# Patient Record
Sex: FEMALE | Race: WHITE | NOT HISPANIC OR LATINO | Employment: STUDENT | URBAN - METROPOLITAN AREA
[De-identification: names, ages, dates, MRNs, and addresses within clinical notes are randomized per-mention and may not be internally consistent; named-entity substitution may affect disease eponyms.]

---
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
## 2023-05-04 ENCOUNTER — HOSPITAL ENCOUNTER (EMERGENCY)
Facility: HOSPITAL | Age: 18
Discharge: LEFT BEFORE DISCHARGE | End: 2023-05-05
Attending: EMERGENCY MEDICINE
Payer: COMMERCIAL

## 2023-05-04 ENCOUNTER — APPOINTMENT (OUTPATIENT)
Dept: CT IMAGING | Facility: HOSPITAL | Age: 18
End: 2023-05-04
Payer: COMMERCIAL

## 2023-05-04 VITALS
RESPIRATION RATE: 16 BRPM | TEMPERATURE: 99.3 F | SYSTOLIC BLOOD PRESSURE: 166 MMHG | DIASTOLIC BLOOD PRESSURE: 98 MMHG | WEIGHT: 160 LBS | HEART RATE: 107 BPM | HEIGHT: 70 IN | BODY MASS INDEX: 22.9 KG/M2 | OXYGEN SATURATION: 96 %

## 2023-05-04 DIAGNOSIS — M54.2 NECK PAIN: ICD-10-CM

## 2023-05-04 DIAGNOSIS — S29.012A STRAIN OF MID-BACK, INITIAL ENCOUNTER: ICD-10-CM

## 2023-05-04 DIAGNOSIS — S32.010A COMPRESSION FRACTURE OF L1 VERTEBRA, INITIAL ENCOUNTER: Primary | ICD-10-CM

## 2023-05-04 PROCEDURE — 72131 CT LUMBAR SPINE W/O DYE: CPT

## 2023-05-04 PROCEDURE — 99283 EMERGENCY DEPT VISIT LOW MDM: CPT

## 2023-05-04 PROCEDURE — 72125 CT NECK SPINE W/O DYE: CPT

## 2023-05-04 PROCEDURE — 72128 CT CHEST SPINE W/O DYE: CPT

## 2023-05-04 RX ORDER — CYCLOBENZAPRINE HCL 10 MG
10 TABLET ORAL 3 TIMES DAILY PRN
Qty: 21 TABLET | Refills: 0 | Status: SHIPPED | OUTPATIENT
Start: 2023-05-04 | End: 2023-05-11

## 2023-05-04 RX ORDER — BACITRACIN ZINC 500 [USP'U]/G
1 OINTMENT TOPICAL ONCE
Status: DISCONTINUED | OUTPATIENT
Start: 2023-05-04 | End: 2023-05-05 | Stop reason: HOSPADM

## 2023-05-04 RX ORDER — HYDROCODONE BITARTRATE AND ACETAMINOPHEN 5; 325 MG/1; MG/1
1 TABLET ORAL EVERY 6 HOURS PRN
Qty: 20 TABLET | Refills: 0 | Status: SHIPPED | OUTPATIENT
Start: 2023-05-04

## 2023-05-05 NOTE — ED NOTES
"Pt is refusing to remain in the bed. Pt has been warned multiple times of risks d/t her her RADHA but she insists that she wants to get up. States \"I don't care. I want to get up. I will sign a paper saying I wont roxann.\"   "

## 2023-05-05 NOTE — DISCHARGE INSTRUCTIONS
Medication as directed.  Wear TLSO brace except for bathing.  Recommend wearing during sleep if you have pain or move about much during sleep.  Follow-up with neurosurgery as above.  Apply heat to sore stiff muscles.  Apply ice for pain.  Follow-up with neurosurgery as above.  Return to ED for medical emergencies

## 2023-05-05 NOTE — ED NOTES
Patient would not wait for [facility] representative to place back brace on Pt. Pt was educated by previous nurse and this nurse of the importance of staying to have back brace put on by Jaqueline. Pt verbalized understanding and still insisted on leaving. This nurse provided [facility] number to patient.
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
## 2024-08-15 ENCOUNTER — PATIENT ROUNDING (BHMG ONLY) (OUTPATIENT)
Dept: OBSTETRICS AND GYNECOLOGY | Age: 19
End: 2024-08-15
Payer: COMMERCIAL

## 2024-08-15 ENCOUNTER — OFFICE VISIT (OUTPATIENT)
Dept: OBSTETRICS AND GYNECOLOGY | Age: 19
End: 2024-08-15
Payer: COMMERCIAL

## 2024-08-15 VITALS
SYSTOLIC BLOOD PRESSURE: 112 MMHG | HEIGHT: 69 IN | BODY MASS INDEX: 20.91 KG/M2 | DIASTOLIC BLOOD PRESSURE: 72 MMHG | WEIGHT: 141.2 LBS

## 2024-08-15 DIAGNOSIS — Z12.4 SCREENING FOR MALIGNANT NEOPLASM OF CERVIX: ICD-10-CM

## 2024-08-15 DIAGNOSIS — Z11.3 SCREENING EXAMINATION FOR STI: ICD-10-CM

## 2024-08-15 DIAGNOSIS — R10.2 PELVIC PAIN: ICD-10-CM

## 2024-08-15 DIAGNOSIS — Z01.419 ENCOUNTER FOR WELL WOMAN EXAM WITH ROUTINE GYNECOLOGICAL EXAM: Primary | ICD-10-CM

## 2024-08-15 DIAGNOSIS — N93.9 VAGINAL BLEEDING, ABNORMAL: ICD-10-CM

## 2024-08-15 DIAGNOSIS — N94.10 FEMALE DYSPAREUNIA: ICD-10-CM

## 2024-08-15 NOTE — ASSESSMENT & PLAN NOTE
Discussed likely etiology of 3 month bleeding is episode is atrophic state of endometrium due to prolonged depo use. As this has currently improved, agreeable to monitoring symptoms but could trial addition of OCP for short course vs alternative contraceptive option.

## 2024-08-15 NOTE — PROGRESS NOTES
[facility]  Obstetrics and Gynecology   Routine Annual Visit    8/15/2024    Patient: Milli Jones          MR#:[id number]    History of Present Illness    Chief Complaint   Patient presents with    Gynecologic Exam     New Gyn, last annual 12/15/22, Annual today, Pt c/o pain with sexual intercourse,       19 y.o. female  who presents for well woman exam.     Patient's primary concern is pelvic pain and dyspareunia. This has been present for several years. The pelvic pain not associated with sex she has always associated with ovarian cysts, though she has not had imaging to confirm presence of any cysts. She will sometimes feel this multiple times a month which concerns her. The dyspareunia is present deep in her pelvis no matter the position and will persist following intercourse for 2 hours. Described as tearing. It will sometimes cause her to vomit. Denies post coital bleeding or vaginal dryness.    She has been on depo provera for almost 3 years with last dose 7/3. With the dose given before that, she had nearly daily heavy bleeding and cramping. This has improved with her most recent dose.     She is unsure what her typical menses is like as she has been on OCPs or depo for a long time. Completed gardasil series in 2019.    Obstetric History:  OB History          1    Para   1    Term           1    AB        Living             SAB        IAB        Ectopic        Molar        Multiple        Live Births                   Menstrual History:     Patient's last menstrual period was 2024 (approximate).       Sexual History:    In monogamous relationship with boyfriend.  ________________________________________  Patient Active Problem List   Diagnosis    Miscarriage    Fatigue    Smoking    Benign essential tremor     Past Medical History:   Diagnosis Date    Anemia     Anxiety     Depression      Past Surgical History:   Procedure Laterality Date    CYST REMOVAL       "WISDOM TOOTH EXTRACTION       Social History     Tobacco Use   Smoking Status Every Day    Current packs/day: 1.00    Average packs/day: 1 pack/day for 13.6 years (13.6 ttl pk-yrs)    Types: Cigarettes    Start date: 1/1/2011   Smokeless Tobacco Never     Patient previously in foster care. Presented with her \"mother in law\" who is very supportive.    Family History   Problem Relation Age of Onset    No Known Problems Mother     No Known Problems Father      Prior to Admission medications    Medication Sig Start Date End Date Taking? Authorizing Provider   medroxyPROGESTERone (DEPO-PROVERA) 150 MG/ML injection Inject 1 mL into the appropriate muscle as directed by prescriber Every 3 (Three) Months.   Yes Provider, Armando, MD     ________________________________________    Review of Systems   Gastrointestinal:  Negative for abdominal pain.   Genitourinary:  Positive for dyspareunia, pelvic pain and vaginal bleeding. Negative for dysuria, hematuria, urgency, vaginal discharge and vaginal pain.          Objective     /72   Ht 175.3 cm (69.02\")   Wt 64 kg (141 lb 3.2 oz)   LMP 07/25/2024 (Approximate)   BMI 20.84 kg/m²    BP Readings from Last 3 Encounters:   08/15/24 112/72   05/14/24 97/63   05/04/23 166/98      Wt Readings from Last 3 Encounters:   08/15/24 64 kg (141 lb 3.2 oz)   05/14/24 64.5 kg (142 lb 4.8 oz)   05/04/23 72.6 kg (160 lb)        BMI: Estimated body mass index is 20.84 kg/m² as calculated from the following:    Height as of this encounter: 175.3 cm (69.02\").    Weight as of this encounter: 64 kg (141 lb 3.2 oz).    Physical Exam  Vitals reviewed.   Constitutional:       Appearance: Normal appearance. She is normal weight.   HENT:      Head: Normocephalic and atraumatic.   Pulmonary:      Effort: Pulmonary effort is normal.   Abdominal:      Palpations: Abdomen is soft.      Tenderness: There is no abdominal tenderness.   Genitourinary:     Comments: Normal external female genitalia, " "normal appearing labia majora and minora, no labial adhesions, normal vagina, normal appearing cervix, normal urethra. Internal exam without palpable cervical, uterine, or adnexal masses, minimal bilateral adnexal tenderness. Uterus mobile and approximately 6 size. No evidence of prolapse    Neurological:      General: No focal deficit present.      Mental Status: She is alert.       Assessment:  Milli Jones is a 19 y.o.  presenting for well woman exam.    Diagnoses and all orders for this visit:    1. Encounter for well woman exam with routine gynecological exam (Primary)  -     IGP,CtNgTv,Apt HPV  -     Chlamydia trachomatis, Neisseria gonorrhoeae, Trichomonas vaginalis, PCR - Swab, Vagina  -     IGP, Apt HPV,rfx 16 / 18,45; Future  -     IGP, Apt HPV,rfx 16 / 18,45    2. Screening examination for STI    3. Screening for malignant neoplasm of cervix    4. Pelvic pain  Assessment & Plan:  Patient with intermittent pelvic pain in addition to consistent deep dyspareunia. Reviewed possible etiologies of this including ovarian cyst rupture, endometriosis, structural causes, or other non-GYN etiologies. Will evaluate for possible structural causes with US prior to next appointment. Discussed with patient endometriosis could only be definitively diagnosed with surgery but can trial alternative treatments prior to moving to that option. Was told by prior provider her pain could be due to \"scar tissue\" from her second trimester loss - we discussed this is extremely unlikely.      5. Vaginal bleeding, abnormal  Assessment & Plan:  Discussed likely etiology of 3 month bleeding is episode is atrophic state of endometrium due to prolonged depo use. As this has currently improved, agreeable to monitoring symptoms but could trial addition of OCP for short course vs alternative contraceptive option.      6. Female dyspareunia  Assessment & Plan:  See pelvic pain        Plan:  Return for Recheck pelvic pain. In 2-3 weeks " with US prior to appointment.      Ashly Gutierres MD  8/15/2024 13:43 EDT

## 2024-08-15 NOTE — ASSESSMENT & PLAN NOTE
"Patient with intermittent pelvic pain in addition to consistent deep dyspareunia. Reviewed possible etiologies of this including ovarian cyst rupture, endometriosis, structural causes, or other non-GYN etiologies. Will evaluate for possible structural causes with US prior to next appointment. Discussed with patient endometriosis could only be definitively diagnosed with surgery but can trial alternative treatments prior to moving to that option. Was told by prior provider her pain could be due to \"scar tissue\" from her second trimester loss - we discussed this is extremely unlikely.  "

## 2024-08-16 LAB
C TRACH RRNA SPEC QL NAA+PROBE: NEGATIVE
N GONORRHOEA RRNA SPEC QL NAA+PROBE: NEGATIVE
T VAGINALIS RRNA SPEC QL NAA+PROBE: NEGATIVE

## 2024-08-20 LAB
CYTOLOGIST CVX/VAG CYTO: NORMAL
CYTOLOGY CVX/VAG DOC CYTO: NORMAL
CYTOLOGY CVX/VAG DOC THIN PREP: NORMAL
DX ICD CODE: NORMAL
HPV I/H RISK 4 DNA CVX QL PROBE+SIG AMP: NEGATIVE
Lab: NORMAL
OTHER STN SPEC: NORMAL
STAT OF ADQ CVX/VAG CYTO-IMP: NORMAL

## 2024-09-05 ENCOUNTER — OFFICE VISIT (OUTPATIENT)
Dept: OBSTETRICS AND GYNECOLOGY | Age: 19
End: 2024-09-05
Payer: COMMERCIAL

## 2024-09-05 VITALS
WEIGHT: 141.2 LBS | BODY MASS INDEX: 20.91 KG/M2 | SYSTOLIC BLOOD PRESSURE: 118 MMHG | HEIGHT: 69 IN | DIASTOLIC BLOOD PRESSURE: 70 MMHG

## 2024-09-05 DIAGNOSIS — R10.2 PELVIC PAIN: Primary | ICD-10-CM

## 2024-09-05 DIAGNOSIS — N94.10 FEMALE DYSPAREUNIA: ICD-10-CM

## 2024-09-05 PROCEDURE — 99213 OFFICE O/P EST LOW 20 MIN: CPT | Performed by: STUDENT IN AN ORGANIZED HEALTH CARE EDUCATION/TRAINING PROGRAM

## 2024-09-05 RX ORDER — NORETHINDRONE ACETATE AND ETHINYL ESTRADIOL 1MG-20(21)
1 KIT ORAL DAILY
Qty: 84 TABLET | Refills: 3 | Status: SHIPPED | OUTPATIENT
Start: 2024-09-05

## 2024-09-05 NOTE — ASSESSMENT & PLAN NOTE
Patient with pelvic pain unchanged since last visit.  Has also started to have more irregular spotting since her Depo injection at the beginning of July 2024.  Ultrasound performed today was normal without possible structural etiology to explain patient's pelvic pain and dyspareunia.  At this point we discussed that our differential includes possible ovarian cysts, which patient believes she has even though none were visualized today, or endometriosis.  I also discussed that I feel her irregular spotting is due to her long-term progesterone use, as she has been on Depo-Provera for 3 years.  At this point, I feel patient would benefit from cessation of Depo-Provera, and initiation of combined oral contraceptives with placebo pills every months.  We discussed that this will allow her endometrium to receive some estrogen and resume monthly shedding to normalize her bleeding.  We also discussed that if her pain is due to ovarian cysts, this should prevent their recurrence.  We also discussed that OCPs would treat endometriosis.  At this time we will trial monthly menses, to get an idea if heavy menstrual bleeding or dysmenorrhea is an issue for her despite the use of oral contraceptives.  Patient has been on various birth controls essentially since the onset of menses, and she has no idea what her normal cycle is like.  Extensive counseling performed regarding daily contraceptive use at same time, and documentation of any symptoms including irregular bleeding, pain, or anything that feels significant to her so that we have an accurate journal of her experience.  All questions and concerns addressed.  Patient in agreement with plan.

## 2024-09-05 NOTE — PROGRESS NOTES
[facility]   Obstetrics and Gynecology   Return Gynecology Visit    2024    Patient: Milli Jones          MR#:[id number]    History of Present Illness    Chief Complaint   Patient presents with    Gynecologic Exam     Pt c/o pain with intercourse. Going on for 2 years. So bad she vomits       19 y.o. female  who presents for follow-up of pelvic pain and dyspareunia.    Patient reports her symptoms are essentially unchanged.  She continues to have bilateral discomfort in the areas of her ovaries intermittently.  Also continues to have deep dyspareunia.  She notes that she has started to have more irregular spotting since her last Depo-Provera dose in July.  We reviewed her contraceptive use history and clarified that she was on OCPs from age 7 to her mid teen years.  She then had her second trimester loss, and immediately had Nexplanon in place for 2 years.  She has then used Depo for the past 3.    Obstetric History:  OB History          1    Para   1    Term           1    AB        Living             SAB        IAB        Ectopic        Molar        Multiple        Live Births                   Menstrual History:     Patient's last menstrual period was 2024 (approximate).     ________________________________________  Patient Active Problem List   Diagnosis    Miscarriage    Fatigue    Smoking    Benign essential tremor    Vaginal bleeding, abnormal    Pelvic pain    Female dyspareunia     Past Medical History:   Diagnosis Date    Anemia     Anxiety     Depression      Past Surgical History:   Procedure Laterality Date    CYST REMOVAL      right hand    TYMPANOSTOMY      WISDOM TOOTH EXTRACTION       Social History     Tobacco Use   Smoking Status Former    Current packs/day: 0.00    Average packs/day: 1 pack/day for 13.5 years (13.5 ttl pk-yrs)    Types: Cigarettes    Start date: 2011    Quit date: 2024    Years since quittin.1   Smokeless Tobacco  "Never   Tobacco Comments    Vapes with nicotine     Family History   Problem Relation Age of Onset    No Known Problems Father     No Known Problems Mother     Endometriosis Maternal Grandmother      Prior to Admission medications    Medication Sig Start Date End Date Taking? Authorizing Provider   medroxyPROGESTERone (DEPO-PROVERA) 150 MG/ML injection Inject 1 mL into the appropriate muscle as directed by prescriber Every 3 (Three) Months.   Yes Provider, MD Armando     ________________________________________    Review of Systems   Constitutional:  Negative for chills and fever.   Gastrointestinal:  Negative for abdominal pain, constipation, diarrhea, nausea and vomiting.   Genitourinary:  Positive for dyspareunia and pelvic pain. Negative for dysuria, frequency and urgency.            Objective     /70   Ht 175.3 cm (69.02\")   Wt 64 kg (141 lb 3.2 oz)   LMP 07/25/2024 (Approximate)   BMI 20.84 kg/m²    BP Readings from Last 3 Encounters:   09/05/24 118/70   08/15/24 112/72   05/14/24 97/63      Wt Readings from Last 3 Encounters:   09/05/24 64 kg (141 lb 3.2 oz)   08/15/24 64 kg (141 lb 3.2 oz)   05/14/24 64.5 kg (142 lb 4.8 oz)        BMI: Estimated body mass index is 20.84 kg/m² as calculated from the following:    Height as of this encounter: 175.3 cm (69.02\").    Weight as of this encounter: 64 kg (141 lb 3.2 oz).    Physical Exam  Vitals and nursing note reviewed.   Constitutional:       General: She is not in acute distress.     Appearance: Normal appearance.   HENT:      Head: Normocephalic and atraumatic.   Eyes:      Extraocular Movements: Extraocular movements intact.   Cardiovascular:      Rate and Rhythm: Normal rate and regular rhythm.   Pulmonary:      Effort: Pulmonary effort is normal. No respiratory distress.   Abdominal:      General: There is no distension.      Palpations: Abdomen is soft. There is no mass.      Tenderness: There is no abdominal tenderness.   Musculoskeletal:    "      General: No swelling. Normal range of motion.      Cervical back: Normal range of motion.   Skin:     General: Skin is warm and dry.   Neurological:      General: No focal deficit present.      Mental Status: She is alert and oriented to person, place, and time.   Psychiatric:         Mood and Affect: Mood normal.         Behavior: Behavior normal.         TVUS 2024   Impression:    1.  Uterus: Normal size, with uterine dimensions 7.4 x 5.1 x 3.6 cm, and Anteverted    2.  Endometrium: Normal non-menopausal thickness    3.  Myometrium: Normal homogenous texture     4.  Ovaries   Left: Normal/unremarkable    Right: Normal/unremarkable     Assessment:  Milli Jones is a 19 y.o.  presenting for follow-up of pelvic pain and dyspareunia.    Diagnoses and all orders for this visit:    1. Pelvic pain (Primary)  Assessment & Plan:  Patient with pelvic pain unchanged since last visit.  Has also started to have more irregular spotting since her Depo injection at the beginning of 2024.  Ultrasound performed today was normal without possible structural etiology to explain patient's pelvic pain and dyspareunia.  At this point we discussed that our differential includes possible ovarian cysts, which patient believes she has even though none were visualized today, or endometriosis.  I also discussed that I feel her irregular spotting is due to her long-term progesterone use, as she has been on Depo-Provera for 3 years.  At this point, I feel patient would benefit from cessation of Depo-Provera, and initiation of combined oral contraceptives with placebo pills every months.  We discussed that this will allow her endometrium to receive some estrogen and resume monthly shedding to normalize her bleeding.  We also discussed that if her pain is due to ovarian cysts, this should prevent their recurrence.  We also discussed that OCPs would treat endometriosis.  At this time we will trial monthly menses, to get an  idea if heavy menstrual bleeding or dysmenorrhea is an issue for her despite the use of oral contraceptives.  Patient has been on various birth controls essentially since the onset of menses, and she has no idea what her normal cycle is like.  Extensive counseling performed regarding daily contraceptive use at same time, and documentation of any symptoms including irregular bleeding, pain, or anything that feels significant to her so that we have an accurate journal of her experience.  All questions and concerns addressed.  Patient in agreement with plan.    Orders:  -     norethindrone-ethinyl estradiol FE (JUNEL FE 1/20) 1-20 MG-MCG per tablet; Take 1 tablet by mouth Daily. For new start begin the Sunday after the onset of your next menstrual cycle  Dispense: 84 tablet; Refill: 3    2. Female dyspareunia      Plan:  Return in about 8 weeks (around 10/31/2024) for f/u pelvic pain, OCPs.      Ashly Gutierres MD  9/5/2024 13:50 EDT

## 2024-10-24 ENCOUNTER — OFFICE VISIT (OUTPATIENT)
Dept: OBSTETRICS AND GYNECOLOGY | Age: 19
End: 2024-10-24
Payer: COMMERCIAL

## 2024-10-24 VITALS
HEIGHT: 69 IN | SYSTOLIC BLOOD PRESSURE: 104 MMHG | DIASTOLIC BLOOD PRESSURE: 76 MMHG | WEIGHT: 140.4 LBS | BODY MASS INDEX: 20.79 KG/M2

## 2024-10-24 DIAGNOSIS — N93.9 VAGINAL BLEEDING, ABNORMAL: ICD-10-CM

## 2024-10-24 DIAGNOSIS — N94.10 FEMALE DYSPAREUNIA: Primary | ICD-10-CM

## 2024-10-24 DIAGNOSIS — R10.2 PELVIC PAIN: ICD-10-CM

## 2024-10-24 RX ORDER — PROMETHAZINE HYDROCHLORIDE 25 MG/1
25 TABLET ORAL EVERY 6 HOURS PRN
Qty: 60 TABLET | Refills: 1 | Status: SHIPPED | OUTPATIENT
Start: 2024-10-24

## 2024-10-24 NOTE — PROGRESS NOTES
[facility]   Obstetrics and Gynecology   Return Gynecology Visit    10/24/2024    Patient: Milli Jones          MR#:[id number]    History of Present Illness    Chief Complaint   Patient presents with    Gynecologic Exam     F/u for pelvic pains, pt has no complaints today.     19 y.o. female  who presents for follow-up of pelvic pain.    Patient reports overall her symptoms are not significantly changed with OCPs.  She continues to have severe dyspareunia.  She also had different pelvic pain for about the 2-week period that flanks the time of her menses.  She also for the first month of using OCPs experience severe headaches that have only recently started to taper off.  She also feels like she is more sensitive and easily cries.  Her first menses with OCPs was essentially only a day or 2 of lighter bleeding which occurred on placebo pill days 3 and 4.    Obstetric History:  OB History          1    Para   1    Term           1    AB        Living             SAB        IAB        Ectopic        Molar        Multiple        Live Births                   Menstrual History:   LMP around 10/9/2024  ________________________  Patient Active Problem List   Diagnosis    Miscarriage    Fatigue    Smoking    Benign essential tremor    Vaginal bleeding, abnormal    Pelvic pain    Female dyspareunia     Past Medical History:   Diagnosis Date    Anemia     Anxiety     Depression      Past Surgical History:   Procedure Laterality Date    CYST REMOVAL      right hand    TYMPANOSTOMY      WISDOM TOOTH EXTRACTION       Social History     Tobacco Use   Smoking Status Former    Current packs/day: 0.00    Average packs/day: 1 pack/day for 13.5 years (13.5 ttl pk-yrs)    Types: Cigarettes    Start date: 2011    Quit date: 2024    Years since quittin.2   Smokeless Tobacco Never   Tobacco Comments    Vapes with nicotine     Family History   Problem Relation Age of Onset    No Known  "Problems Father     No Known Problems Mother     Endometriosis Maternal Grandmother      Prior to Admission medications    Medication Sig Start Date End Date Taking? Authorizing Provider   medroxyPROGESTERone (DEPO-PROVERA) 150 MG/ML injection Inject 1 mL into the appropriate muscle as directed by prescriber Every 3 (Three) Months.   Yes ProviderArmando MD   norethindrone-ethinyl estradiol FE (JUNEL FE 1/20) 1-20 MG-MCG per tablet Take 1 tablet by mouth Daily. For new start begin the Sunday after the onset of your next menstrual cycle 9/5/24  Yes Ashly Gutierres MD     ________________________________________    Review of Systems   Genitourinary:  Positive for dyspareunia and pelvic pain.          Objective     /76   Ht 175.3 cm (69.02\")   Wt 63.7 kg (140 lb 6.4 oz)   BMI 20.72 kg/m²    BP Readings from Last 3 Encounters:   10/24/24 104/76   09/05/24 118/70   08/15/24 112/72      Wt Readings from Last 3 Encounters:   10/24/24 63.7 kg (140 lb 6.4 oz)   09/05/24 64 kg (141 lb 3.2 oz)   08/15/24 64 kg (141 lb 3.2 oz)        BMI: Estimated body mass index is 20.72 kg/m² as calculated from the following:    Height as of this encounter: 175.3 cm (69.02\").    Weight as of this encounter: 63.7 kg (140 lb 6.4 oz).    Physical Exam  Vitals and nursing note reviewed.   Constitutional:       General: She is not in acute distress.     Appearance: Normal appearance.   HENT:      Head: Normocephalic and atraumatic.   Eyes:      Extraocular Movements: Extraocular movements intact.   Cardiovascular:      Rate and Rhythm: Normal rate and regular rhythm.   Pulmonary:      Effort: Pulmonary effort is normal. No respiratory distress.   Abdominal:      General: There is no distension.      Palpations: Abdomen is soft. There is no mass.      Tenderness: There is no abdominal tenderness.   Musculoskeletal:         General: No swelling. Normal range of motion.      Cervical back: Normal range of motion.   Skin:     General: " Skin is warm and dry.   Neurological:      General: No focal deficit present.      Mental Status: She is alert and oriented to person, place, and time.   Psychiatric:         Mood and Affect: Mood normal.         Behavior: Behavior normal.       Assessment:  Milli Jones is a 19 y.o.  presenting for follow-up of pelvic pain.    Diagnoses and all orders for this visit:    1. Female dyspareunia (Primary)    2. Pelvic pain  Assessment & Plan:  Patient has been on OCPs for approximately 1-1/2 months and has had no change in her dyspareunia.  She also had significant pelvic pain around the time that menses occurred.  Other side effects included headaches and labile mood.  Although patient has not been on OCPs very long, side effects are undesirable so we will trial a new option.  We discussed initiation of Slynd and patient was agreeable.  She will finish her current pack of OCPs and then switch.  We discussed that if she has intolerable side effects during placebo pills with Slynd, she can skip placebo pills and take hormonal pills straight through.  We will space follow-up further to allow for adequate trial of Slynd.  We discussed next option would likely be a diagnostic laparoscopy to evaluate for endometriosis or other etiology for her symptoms.    Orders:  -     Drospirenone 4 MG tablet; Take 1 tablet by mouth Daily.  Dispense: 84 tablet; Refill: 3  -     promethazine (PHENERGAN) 25 MG tablet; Take 1 tablet by mouth Every 6 (Six) Hours As Needed for Nausea or Vomiting.  Dispense: 60 tablet; Refill: 1    3. Vaginal bleeding, abnormal      Plan:  Return in about 4 months (around 2025) for f/u pelvic pain.    Ashly Gutierres MD  10/24/2024 14:13 EDT

## 2024-10-24 NOTE — ASSESSMENT & PLAN NOTE
Patient has been on OCPs for approximately 1-1/2 months and has had no change in her dyspareunia.  She also had significant pelvic pain around the time that menses occurred.  Other side effects included headaches and labile mood.  Although patient has not been on OCPs very long, side effects are undesirable so we will trial a new option.  We discussed initiation of Slynd and patient was agreeable.  She will finish her current pack of OCPs and then switch.  We discussed that if she has intolerable side effects during placebo pills with Slynd, she can skip placebo pills and take hormonal pills straight through.  We will space follow-up further to allow for adequate trial of Slynd.  We discussed next option would likely be a diagnostic laparoscopy to evaluate for endometriosis or other etiology for her symptoms.

## 2024-11-18 ENCOUNTER — TELEPHONE (OUTPATIENT)
Dept: OBSTETRICS AND GYNECOLOGY | Age: 19
End: 2024-11-18
Payer: COMMERCIAL